# Patient Record
Sex: MALE | Race: WHITE
[De-identification: names, ages, dates, MRNs, and addresses within clinical notes are randomized per-mention and may not be internally consistent; named-entity substitution may affect disease eponyms.]

---

## 2022-01-28 ENCOUNTER — HOSPITAL ENCOUNTER (EMERGENCY)
Dept: HOSPITAL 97 - ER | Age: 57
Discharge: HOME | End: 2022-01-28
Payer: COMMERCIAL

## 2022-01-28 VITALS — SYSTOLIC BLOOD PRESSURE: 142 MMHG | DIASTOLIC BLOOD PRESSURE: 90 MMHG | OXYGEN SATURATION: 100 %

## 2022-01-28 VITALS — TEMPERATURE: 98.7 F
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
## 2022-01-28 DIAGNOSIS — I10: ICD-10-CM

## 2022-01-28 DIAGNOSIS — M79.10: Primary | ICD-10-CM

## 2022-01-28 PROCEDURE — 72125 CT NECK SPINE W/O DYE: CPT

## 2022-01-28 PROCEDURE — 99284 EMERGENCY DEPT VISIT MOD MDM: CPT

## 2022-01-28 PROCEDURE — 70450 CT HEAD/BRAIN W/O DYE: CPT
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
## 2022-01-28 NOTE — RAD REPORT
EXAM DESCRIPTION:  CT - Head C Spine Mpr Wo Con - 1/28/2022 2:08 pm

 

CLINICAL HISTORY:  Head and neck injury status post fall. Head and neck pain

 

COMPARISON:  None.

 

TECHNIQUE:  Computed axial tomography of the head and cervical spine was obtained.

 

Sagittal and coronal reconstruction was performed.

 

All CT scans are performed using dose optimization technique as appropriate and may include automated
 exposure control or mA/KV adjustment according to patient size.

 

FINDINGS:  An intracranial bleed is not seen. The ventricles are normal in caliber. An extra-axial fl
uid collection is not noted.Fluid within the visualized sinuses and mastoids is not seen

 

A cervical fracture is not visualized. No dislocation is noted. Large osteophytes extend off of the a
nterior aspect of the cervical spine

 

IMPRESSION:  No acute intracranial abnormality is seen.

 

A cervical fracture is not visualized.  If the patient continues to have symptoms to suggest intracra
nial /spinal cord pathology then MRI would be recommended

## 2023-10-06 ENCOUNTER — HOSPITAL ENCOUNTER (EMERGENCY)
Dept: HOSPITAL 97 - ER | Age: 58
Discharge: HOME | End: 2023-10-06
Payer: COMMERCIAL

## 2023-10-06 VITALS — TEMPERATURE: 97.9 F | SYSTOLIC BLOOD PRESSURE: 124 MMHG | OXYGEN SATURATION: 100 % | DIASTOLIC BLOOD PRESSURE: 87 MMHG

## 2023-10-06 DIAGNOSIS — I82.B12: Primary | ICD-10-CM

## 2023-10-06 DIAGNOSIS — I10: ICD-10-CM

## 2023-10-06 DIAGNOSIS — I82.A12: ICD-10-CM

## 2023-10-06 DIAGNOSIS — F17.220: ICD-10-CM

## 2023-10-06 LAB
BUN BLD-MCNC: 14 MG/DL (ref 7–18)
GLUCOSE SERPLBLD-MCNC: 91 MG/DL (ref 74–106)
HCT VFR BLD CALC: 40 % (ref 39.6–49)
INR BLD: 1.1
LYMPHOCYTES # SPEC AUTO: 1.5 K/UL (ref 0.7–4.9)
MCV RBC: 95 FL (ref 80–100)
PMV BLD: 7.2 FL (ref 7.6–11.3)
POTASSIUM SERPL-SCNC: 4.2 MEQ/L (ref 3.5–5.1)
RBC # BLD: 4.21 M/UL (ref 4.33–5.43)
WBC # BLD AUTO: 8.5 THOU/UL (ref 4.3–10.9)

## 2023-10-06 PROCEDURE — 85025 COMPLETE CBC W/AUTO DIFF WBC: CPT

## 2023-10-06 PROCEDURE — 93971 EXTREMITY STUDY: CPT

## 2023-10-06 PROCEDURE — 80048 BASIC METABOLIC PNL TOTAL CA: CPT

## 2023-10-06 PROCEDURE — 85610 PROTHROMBIN TIME: CPT

## 2023-10-06 PROCEDURE — 93931 UPPER EXTREMITY STUDY: CPT

## 2023-10-06 PROCEDURE — 99284 EMERGENCY DEPT VISIT MOD MDM: CPT

## 2023-10-06 PROCEDURE — 36415 COLL VENOUS BLD VENIPUNCTURE: CPT

## 2023-10-06 PROCEDURE — 83605 ASSAY OF LACTIC ACID: CPT

## 2023-10-06 NOTE — ER
Nurse's Notes                                                                                     

 [facility]                                                                 

Name: Adria Sprague                                                                              

Age: 58 yrs                                                                                       

Sex: Male                                                                                         

: 1965                                                                                   

MRN: [id number]                                                                                   

Arrival Date: 10/06/2023                                                                          

Time: 12:33                                                                                       

Account#: [id number]                                                                            

Bed 10                                                                                            

Private MD:                                                                                       

Diagnosis: Acute thrombosis of subclavian and axillary veins                                      

                                                                                                  

Presentation:                                                                                     

10/06                                                                                             

12:52 Chief complaint: Left arm pain, redness, and swelling that started last night.          hb  

      Coronavirus screen: At this time, the client does not indicate any symptoms associated      

      with coronavirus-19. Ebola Screen: No symptoms or risks identified at this time.            

      Initial Sepsis Screen: Does the patient meet any 2 criteria? No. Patient's initial          

      sepsis screen is negative. Does the patient have a suspected source of infection? No.       

      Patient's initial sepsis screen is negative. Risk Assessment: Do you want to hurt           

      yourself or someone else? Patient reports no desire to harm self or others. Onset of        

      symptoms was 2023.                                                              

12:52 Method Of Arrival: Ambulatory                                                           hb  

12:52 Acuity: RIMMA 3                                                                           hb  

                                                                                                  

Historical:                                                                                       

- Allergies:                                                                                      

12:54 No Known Allergies;                                                                     hb  

- Home Meds:                                                                                      

12:54 lisinopril Oral [Active];                                                               hb  

- PMHx:                                                                                           

12:54 Chronic pain; Hypertensive disorder;                                                    hb  

                                                                                                  

- Immunization history:: Adult Immunizations up to date.                                          

- Social history:: Smoking status: Patient reports use of chewing tobacco.                        

                                                                                                  

                                                                                                  

Screenin:00 [facility] ED Fall Risk Assessment (Adult) History of falling in the last 3 months,       ap3 

      including since admission No falls in past 3 months (0 pts). Abuse screen: Denies           

      threats or abuse. Nutritional screening: No deficits noted. Tuberculosis screening: No      

      symptoms or risk factors identified.                                                        

                                                                                                  

Assessment:                                                                                       

12:57 General: Appears uncomfortable, Behavior is calm, cooperative, appropriate for age.     ap3 

      Pain: Complains of pain in left arm. Neuro: Level of Consciousness is awake, alert,         

      obeys commands, Oriented to person, place, time, situation. Cardiovascular: Pulses are      

      palpable in right radial artery due to significant swelling, palpation of patients left     

      upper extremity pulses are difficult at this time. Respiratory: Airway is patent            

      Respiratory effort is even, unlabored, Respiratory pattern is regular, symmetrical.         

15:21 Reassessment: Patient and/or family updated on plan of care and expected duration. Pain ap3 

      level reassessed. Patient is alert, oriented x 3, equal unlabored respirations, skin        

      warm/dry/pink.                                                                              

                                                                                                  

Vital Signs:                                                                                      

12:52  / 87; Pulse 88; Resp 16; Temp 97.9(O); Pulse Ox 100% on R/A; Weight 97.07 kg;    hb  

      Height 6 ft. 3 in. ; Pain 8/10;                                                             

12:52 Body Mass Index 26.75 (97.07 kg, 190.5 cm)                                              hb  

12:52 Pain Scale: Adult                                                                       hb  

                                                                                                  

ED Course:                                                                                        

12:35 Patient arrived in ED.                                                                  rg4 

12:37 [facility]                                                          jh7 

12:37 Thor Williamson MD is Attending Physician.                                            jh7 

12:54 Triage completed.                                                                       hb  

12:54 Arm band placed on.                                                                     hb  

12:57 Bertha Pink, RN is Primary Nurse.                                                  ap3 

13:00 Patient has correct armband on for positive identification. Bed in low position. Call   ap3 

      light in reach. Side rails up X 1. Adult w/ patient.                                        

13:58 Upper Ext Artery Uni Rasta In Process Unspecified.                                        EDMS

13:58 UPPER EXTREMITY VENOUS UNILATE In Process Unspecified.                                  EDMS

14:32 Lactate w/ 2H reflex if indic. Sent.                                                    hb  

14:32 PT-INR Sent.                                                                            hb  

14:32 CBC with Diff Sent.                                                                     hb  

14:32 BMP Sent.                                                                               hb  

14:32 Initial lab(s) drawn, by me, sent to lab. Inserted saline lock: 22 gauge in right       hb  

      antecubital area, using aseptic technique. Blood collected.                                 

15:57 Provided Education on: discharge instructions.                                          ap3 

15:57 No provider procedures requiring assistance completed. IV discontinued, intact,         ap3 

      bleeding controlled, No redness/swelling at site. Pressure dressing applied.                

                                                                                                  

Administered Medications:                                                                         

No medications were administered                                                                  

                                                                                                  

                                                                                                  

Medication:                                                                                       

14:33 VIS not applicable for this client.                                                     hb  

                                                                                                  

Outcome:                                                                                          

15:35 Discharge ordered by MD.                                                                7 

15:57 Discharged to home ambulatory,                                                          ap3 

15:57 Condition: good                                                                             

15:57 Discharge instructions given to patient, Instructed on discharge instructions, follow       

      up and referral plans. medication usage, Demonstrated understanding of instructions,        

      follow-up care, medications, Prescriptions given X 2,                                       

15:58 Patient left the ED.                                                                    ap3 

                                                                                                  

Signatures:                                                                                       

Dispatcher [employer]                           EDMS                                                 

Kenyatta Huff RN RN hb Garcia, Rubi                                 rg4                                                  

Bertha Pink, RN                    RN   ap3                                                  

Mai Bender, FNP                   FNP  jh7                                                  

                                                                                                  

**************************************************************************************************

## 2023-10-06 NOTE — EDPHYS
Physician Documentation                                                                           

 [facility]                                                                 

Name: Adria Sprague                                                                              

Age: 58 yrs                                                                                       

Sex: Male                                                                                         

: 1965                                                                                   

MRN: [id number]                                                                                   

Arrival Date: 10/06/2023                                                                          

Time: 12:33                                                                                       

Account#: [id number]                                                                            

Bed 10                                                                                            

Private MD:                                                                                       

ED Physician Thor Williamson                                                                     

HPI:                                                                                              

10/06                                                                                             

13:00 This 58 yrs old Male presents to ER via Ambulatory with complaints of Arm Swelling.     jh7 

13:00 Onset: The symptoms/episode began/occurred 1 day(s) ago. Associated signs and symptoms: jh7 

      Pertinent negatives: fever. 58-year-old male presents to the ER complaining of left arm     

      swelling, pain, and redness since yesterday. He has a history of hypertension and takes     

      lisinopril. 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     

      very tight. Denies fever or shortness of breath..                                           

                                                                                                  

Historical:                                                                                       

- Allergies:                                                                                      

12:54 No Known Allergies;                                                                     hb  

- Home Meds:                                                                                      

12:54 lisinopril Oral [Active];                                                               hb  

- PMHx:                                                                                           

12:54 Chronic pain; Hypertensive disorder;                                                    hb  

                                                                                                  

- Immunization history:: Adult Immunizations up to date.                                          

- Social history:: Smoking status: Patient reports use of chewing tobacco.                        

                                                                                                  

                                                                                                  

ROS:                                                                                              

13:00 MS/extremity: Positive for pain, swelling, tenderness, of the left arm,                 jh7 

13:00 Constitutional: Negative for fever, chills, and weight loss, Eyes: Negative for injury, jh7 

      pain, redness, and discharge, Neck: Negative for injury, pain, and swelling,                

      Cardiovascular: Negative for chest pain, palpitations, and edema, Respiratory: Negative     

      for shortness of breath, cough, wheezing, and pleuritic chest pain, Abdomen/GI:             

      Negative for abdominal pain, nausea, vomiting, diarrhea, and constipation, Back:            

      Negative for injury and pain, Skin: Negative for injury, rash, and discoloration,           

      Neuro: Negative for headache, weakness, numbness, tingling, and seizure,                    

13:00 All other systems are negative,                                                             

                                                                                                  

Exam:                                                                                             

13:00 Constitutional:  This is a well developed, well nourished patient who is awake, alert,  jh7 

      and in no acute distress. Head/Face:  Normocephalic, atraumatic. Cardiovascular:            

      Regular rate and rhythm with a normal S1 and S2.  No gallops, murmurs, or rubs.  Normal     

      PMI, no JVD.  No pulse deficits. Respiratory:  Lungs have equal breath sounds               

      bilaterally, clear to auscultation and percussion.  No rales, rhonchi or wheezes noted.     

       No increased work of breathing, no retractions or nasal flaring. Abdomen/GI:  Soft,        

      non-tender, with normal bowel sounds.  No distension or tympany.  No guarding or            

      rebound.  No evidence of tenderness throughout. Skin:  Warm, dry with normal turgor.        

      Normal color with no rashes, no lesions, and no evidence of cellulitis. Neuro:  Awake       

      and alert, GCS 15, oriented to person, place, time, and situation. Motor strength 5/5       

      in all extremities.  Sensory grossly intact.  Normal gait.                                  

13:00 Musculoskeletal/extremity: ROM: full active range of motion, Circulation is intact in       

      all extremities. Sensation intact. Entire left arm is tender and swollen with the most      

      significant swelling slightly distal to the elbow where skin is tight..                     

                                                                                                  

Vital Signs:                                                                                      

12:52  / 87; Pulse 88; Resp 16; Temp 97.9(O); Pulse Ox 100% on R/A; Weight 97.07 kg;    hb  

      Height 6 ft. 3 in. ; Pain 8/10;                                                             

12:52 Body Mass Index 26.75 (97.07 kg, 190.5 cm)                                              hb  

12:52 Pain Scale: Adult                                                                       hb  

                                                                                                  

MDM:                                                                                              

12:37 Patient medically screened.                                                             [facility] 

15:15 Differential diagnosis: DVT, acute arterial occlusion, cellulitis. Data reviewed: vital [facility] 

      signs, nurses notes, lab test result(s), radiologic studies, ultrasound. Consideration      

      of Admission/Observation Escalation of care including admission/observation considered.     

      I considered the following discharge prescriptions or medication management in the          

      emergency department Medications were administered in the Emergency Department. See         

      MAR. Care significantly affected by the following chronic conditions: Hypertension.         

      Counseling: I had a detailed discussion with the patient and/or guardian regarding the      

      historical points, exam findings, and any diagnostic results supporting the                 

      discharge/admit diagnosis, the need for outpatient follow up, for definitive care. ED       

      course: Discussed possible admission of the patient due to significant swelling of arm.     

      The patient stated he would rather go home and follow-up with his PCP on Monday. Agreed     

      to provide him a work note so that he may follow-up. Will start on Eliquis starter          

      pack..                                                                                      

                                                                                                  

10/06                                                                                             

12:58 Order name: BMP; Complete Time: 14:55                                                   [facility] 

10/06                                                                                             

12:58 Order name: CBC with Diff; Complete Time: 14:55                                         7 

10/06                                                                                             

12:58 Order name: PT-INR; Complete Time: 14:55                                                7 

10/06                                                                                             

12:58 Order name: Lactate w/ 2H reflex if indic.; Complete Time: 14:57                        7 

10/06                                                                                             

13:02 Order name: Upper Ext Artery Uni Rasta; Complete Time: 14:31                              EDMS

10/06                                                                                             

13:02 Order name: UPPER EXTREMITY VENOUS UNILATE; Complete Time: 15:07                        EDMS

                                                                                                  

Administered Medications:                                                                         

No medications were administered                                                                  

                                                                                                  

                                                                                                  

Disposition:                                                                                      

16:58 Co-signature as Attending Physician, Thor Williamson MD I reviewed the patient's care   rt  

      provided by the Advanced Practice Provider and agree with the diagnosis and treatment       

      plan.                                                                                       

                                                                                                  

Disposition Summary:                                                                              

10/06/23 15:35                                                                                    

Discharge Ordered                                                                                 

 Notes:       Location: Home                                                                        
  [facility]

      Problem: new                                                                            [facility] 

      Symptoms: are unchanged                                                                 [facility] 

      Condition: Stable                                                                       [facility] 

      Diagnosis                                                                                   

        - Acute thrombosis of subclavian and axillary veins                                   [facility] 

      Followup:                                                                               [facility] 

        - With: Private Physician                                                                  

        - When: 2 - 3 days                                                                         

        - Reason: Recheck today's complaints                                                       

      Discharge Instructions:                                                                     

        - Discharge Summary Sheet                                                             [facility] 

        - Deep Vein Thrombosis                                                                [facility] 

      Forms:                                                                                      

        - Work release form                                                                   ap3 

        - Medication Reconciliation Form                                                      [facility] 

        - Thank You Letter                                                                    [facility] 

        - Patient Portal Instructions                                                         [facility] 

        - Leadership Thank You Letter                                                         [facility] 

      Prescriptions:                                                                              

        - Eliquis DVT-PE Treat 30D Start 5 mg (74 tabs) Oral Tablet, Dose Pack                     

            - take 1 tablet ORAL route per package directions for 30 days; 74 tablet;         [facility] 

      Refills: 0, Product Selection Permitted                                                     

        - Tramadol 50 mg Oral Tablet                                                               

            - take 1 tablet ORAL route every 8 hours as needed; 12 tablet; Refills: 0,        [facility] 

      Product Selection Permitted                                                                 

Signatures:                                                                                       

[facility]                           EDMS                                                 

Kenyatta Huff RN                     RN   Mai Cornelius, LAKHWINDERP                   FNP  [facility]                                                  

Thor Williamson MD MD   rt                                                   

                                                                                                  

Corrections: (The following items were deleted from the chart)                                    

13:00 12:58 Lower Extremity Artery Uni Ltd+US.RAD.BRZ ordered. EDMS                           EDMS

13:00 12:58 Extremity Venous Uni Ltd+US.RAD.BRZ ordered. EDMS                                 EDMS

16:55 13:00 MS/extremity: Positive for pain, swelling, tenderness, of the left arm, jh7       jh7 

                                                                                                  

**************************************************************************************************

## 2023-10-06 NOTE — RAD REPORT
EXAM DESCRIPTION:  USUpper Ext Artery Uni Bil10/6/2023 1:56 pm

 

CLINICAL HISTORY:   Left arm swelling and pain

 

COMPARISON:   None

 

FINDINGS:  The left common carotid, left subclavian, left subclavian, left axillary, left brachial, l
eft basilic, left ulnar and left radial arteries demonstrate normal waveforms.

 

No significant stenosis/occlusion

 

Doppler demonstrates good flow.

 

Grayscale, color and spectral analysis performed on all vessels

 

IMPRESSION:   Unremarkable exam

## 2023-10-06 NOTE — RAD REPORT
EXAM DESCRIPTION:  US - UPPER EXTREMITY VENOUS UNILATE - 10/6/2023 1:57 pm

 

CLINICAL HISTORY:  left arm swelling.

 

COMPARISON:  None.

 

FINDINGS:  Echogenic material consistent with acute thrombus is present within the left subclavian an
d left axillary veins

 

Left internal jugular vein, left brachial vein, left cephalic, left basilic, left ulnar and left radi
al veins demonstrate phasic signal.The veins are compressible. Doppler demonstrates good flow.

 

Grayscale, color and spectral analysis performed on all vessels

 

IMPRESSION:  Acute thrombus left subclavian and left axillary veins